# Patient Record
Sex: MALE | Race: OTHER
[De-identification: names, ages, dates, MRNs, and addresses within clinical notes are randomized per-mention and may not be internally consistent; named-entity substitution may affect disease eponyms.]

---

## 2021-02-05 ENCOUNTER — HOSPITAL ENCOUNTER (EMERGENCY)
Dept: HOSPITAL 72 - EMR | Age: 26
Discharge: LEFT BEFORE DISCHARGE | End: 2021-02-05
Payer: COMMERCIAL

## 2021-02-05 DIAGNOSIS — Z53.21: ICD-10-CM

## 2021-02-05 DIAGNOSIS — M79.642: Primary | ICD-10-CM

## 2021-02-05 NOTE — EMERGENCY ROOM REPORT
Medical Decision Making


Diagnostic Impression:  


   Primary Impression:  


   LEFT HAND PAIN


ER Course


Refused medical screening exam


Disposition:  LEFT W/OUT BEING SEEN


Condition:  Unknown


Referrals:  


FLAKO RUIZ,REFERRING (PCP)











Kiran Pinto MD                Feb 5, 2021 13:36